# Patient Record
Sex: FEMALE | Race: BLACK OR AFRICAN AMERICAN | URBAN - METROPOLITAN AREA
[De-identification: names, ages, dates, MRNs, and addresses within clinical notes are randomized per-mention and may not be internally consistent; named-entity substitution may affect disease eponyms.]

---

## 2021-04-26 ENCOUNTER — WEB ENCOUNTER (OUTPATIENT)
Dept: URBAN - METROPOLITAN AREA CLINIC 94 | Facility: CLINIC | Age: 61
End: 2021-04-26

## 2021-04-26 ENCOUNTER — DASHBOARD ENCOUNTERS (OUTPATIENT)
Age: 61
End: 2021-04-26

## 2021-04-26 ENCOUNTER — CLAIMS CREATED FROM THE CLAIM WINDOW (OUTPATIENT)
Dept: URBAN - METROPOLITAN AREA CLINIC 94 | Facility: CLINIC | Age: 61
End: 2021-04-26
Payer: COMMERCIAL

## 2021-04-26 VITALS
HEIGHT: 67 IN | HEART RATE: 61 BPM | DIASTOLIC BLOOD PRESSURE: 110 MMHG | SYSTOLIC BLOOD PRESSURE: 159 MMHG | WEIGHT: 189 LBS | TEMPERATURE: 97.7 F | BODY MASS INDEX: 29.66 KG/M2

## 2021-04-26 DIAGNOSIS — N85.9 LESION OF ENDOMETRIUM: ICD-10-CM

## 2021-04-26 DIAGNOSIS — N85.8 OTHER SPECIFIED NONINFLAMMATORY DISORDERS OF UTERUS: ICD-10-CM

## 2021-04-26 DIAGNOSIS — K59.09 OTHER CONSTIPATION: ICD-10-CM

## 2021-04-26 PROBLEM — 14760008: Status: ACTIVE | Noted: 2021-04-26

## 2021-04-26 PROCEDURE — 99203 OFFICE O/P NEW LOW 30 MIN: CPT | Performed by: INTERNAL MEDICINE

## 2021-04-26 PROCEDURE — 99243 OFF/OP CNSLTJ NEW/EST LOW 30: CPT | Performed by: PHYSICIAN ASSISTANT

## 2021-04-26 PROCEDURE — 99203 OFFICE O/P NEW LOW 30 MIN: CPT | Performed by: PHYSICIAN ASSISTANT

## 2021-04-26 PROCEDURE — 99243 OFF/OP CNSLTJ NEW/EST LOW 30: CPT | Performed by: INTERNAL MEDICINE

## 2021-04-26 RX ORDER — SIMVASTATIN 20 MG/1
1 TABLET IN THE EVENING TABLET, FILM COATED ORAL ONCE A DAY
Status: ACTIVE | COMMUNITY

## 2021-04-26 RX ORDER — DILTIAZEM HYDROCHLORIDE 120 MG/1
1 CAPSULE CAPSULE, EXTENDED RELEASE ORAL TWICE A DAY
Status: ACTIVE | COMMUNITY

## 2021-04-26 RX ORDER — CHOLECALCIFEROL (VITAMIN D3) 50 MCG
1 CAPSULE TABLET ORAL ONCE A DAY
Status: ACTIVE | COMMUNITY

## 2021-04-26 NOTE — HPI-TODAY'S VISIT:
61 yo F referred to our office by PCP, DR Sirisha Sierra for screening colonoscopy. Copy of records will be faxed to DR Sierra for review.   Patient denies ever having a colonoscopy. She reports a 3 mos hx of abdominal pain and vaginal bleeding. Pt had CT which revealed a Expansile complex cystic lesion in the endometrial cavity measuring 12.7 x 13.6 x 13.7 cm concerning for malignancy. Pt has been referred to a physician in [place] but before procedure she has been recommended to have a colonoscopy.   The patient reports a recent hx of constipation which she attributes to Ibuprofen use.  No melena or hematochezia. She reports diffuse abdominal pain worse in lower abdomen. She denies a family hx of colon cancer or polyps. Pt denies unintentional wt loss.

## 2021-04-26 NOTE — PHYSICAL EXAM GASTROINTESTINAL
Abdomen , soft, diffuse abdominal tenderness worse lower abdomen, surgical scars noted,  nondistended , no guarding or rigidity , no masses palpable , normal bowel sounds , Liver and Spleen , no hepatomegaly present , no hepatosplenomegaly , liver nontender , spleen not palpable

## 2021-05-05 ENCOUNTER — OFFICE VISIT (OUTPATIENT)
Dept: URBAN - METROPOLITAN AREA SURGERY CENTER 17 | Facility: SURGERY CENTER | Age: 61
End: 2021-05-05